# Patient Record
Sex: FEMALE | Race: WHITE
[De-identification: names, ages, dates, MRNs, and addresses within clinical notes are randomized per-mention and may not be internally consistent; named-entity substitution may affect disease eponyms.]

---

## 2019-06-26 ENCOUNTER — HOSPITAL ENCOUNTER (EMERGENCY)
Dept: HOSPITAL 8 - ED | Age: 49
Discharge: HOME | End: 2019-06-26
Payer: MEDICAID

## 2019-06-26 VITALS — WEIGHT: 262.35 LBS | HEIGHT: 67 IN | BODY MASS INDEX: 41.18 KG/M2

## 2019-06-26 VITALS — SYSTOLIC BLOOD PRESSURE: 121 MMHG | DIASTOLIC BLOOD PRESSURE: 79 MMHG

## 2019-06-26 DIAGNOSIS — Z00.00: Primary | ICD-10-CM

## 2019-06-26 DIAGNOSIS — Z87.01: ICD-10-CM

## 2019-06-26 DIAGNOSIS — E78.00: ICD-10-CM

## 2019-06-26 PROCEDURE — 99281 EMR DPT VST MAYX REQ PHY/QHP: CPT

## 2019-06-26 NOTE — NUR
PT AMBULATORY TO ROOM. STATES SHE WANTS A "DUAL DIAGNOSIS" BECAUSE HER SISTER 
THINKS SHE NEEDS ONE. STATES SHE LEFT REHAB FOR ALCOHOL ABUSE LAST WEEK. STATES 
LAST DRINK WAS IN APRIL. JOLLY FRIAS. CALL LIGHT IN REACH.